# Patient Record
Sex: MALE | Race: BLACK OR AFRICAN AMERICAN | NOT HISPANIC OR LATINO | Employment: FULL TIME | URBAN - METROPOLITAN AREA
[De-identification: names, ages, dates, MRNs, and addresses within clinical notes are randomized per-mention and may not be internally consistent; named-entity substitution may affect disease eponyms.]

---

## 2020-02-03 ENCOUNTER — HOSPITAL ENCOUNTER (EMERGENCY)
Facility: HOSPITAL | Age: 35
Discharge: HOME | End: 2020-02-03
Attending: EMERGENCY MEDICINE

## 2020-02-03 VITALS
TEMPERATURE: 99 F | OXYGEN SATURATION: 98 % | RESPIRATION RATE: 18 BRPM | WEIGHT: 311.63 LBS | HEART RATE: 88 BPM | HEIGHT: 71 IN | SYSTOLIC BLOOD PRESSURE: 143 MMHG | DIASTOLIC BLOOD PRESSURE: 89 MMHG | BODY MASS INDEX: 43.63 KG/M2

## 2020-02-03 DIAGNOSIS — R30.0 DYSURIA: Primary | ICD-10-CM

## 2020-02-03 DIAGNOSIS — J06.9 VIRAL URI WITH COUGH: ICD-10-CM

## 2020-02-03 DIAGNOSIS — R06.02 SHORTNESS OF BREATH: ICD-10-CM

## 2020-02-03 LAB
BILIRUB UR QL STRIP: NEGATIVE
CLARITY UR: CLEAR
COLOR UR: YELLOW
GLUCOSE UR QL STRIP: NEGATIVE
HGB UR QL STRIP: NEGATIVE
KETONES UR QL STRIP: NEGATIVE
LEUKOCYTE ESTERASE UR QL STRIP: NEGATIVE
NITRITE UR QL STRIP: NEGATIVE
PH UR STRIP: 6 [PH] (ref 5–8)
PROT UR QL STRIP: NEGATIVE
SP GR UR STRIP: 1.02 (ref 1–1.03)
URN SPEC COLLECT METH UR: NORMAL
UROBILINOGEN UR STRIP-ACNC: 1 EU/DL

## 2020-02-03 PROCEDURE — 81003 URINALYSIS AUTO W/O SCOPE: CPT

## 2020-02-03 PROCEDURE — 99284 EMERGENCY DEPT VISIT MOD MDM: CPT | Mod: 25

## 2020-02-03 RX ORDER — ALBUTEROL SULFATE 90 UG/1
2 AEROSOL, METERED RESPIRATORY (INHALATION) EVERY 4 HOURS PRN
Qty: 6.7 G | Refills: 0 | Status: SHIPPED | OUTPATIENT
Start: 2020-02-03 | End: 2020-02-10

## 2020-02-03 RX ORDER — BENZONATATE 100 MG/1
100 CAPSULE ORAL 3 TIMES DAILY PRN
Qty: 20 CAPSULE | Refills: 0 | Status: SHIPPED | OUTPATIENT
Start: 2020-02-03 | End: 2020-02-10

## 2020-02-04 NOTE — ED PROVIDER NOTES
Encounter Date: 2/3/2020    SCRIBE #1 NOTE: I, Kvng Peralta, am scribing for, and in the presence of,  Dr. Madrid. I have scribed the entire note.       History     Chief Complaint   Patient presents with    Shortness of Breath     34y M ambulatory to ED with c/o SOB and cough x2 months. also c/o 1 episode of blood in urine earlier today     Tobias Gong is a 34 y.o. male who  has a past medical history of Asthma.    The patient presents to the ED due to concern of hematuria. The patient reports a single episode of hematuria tonight with slight dysuria. He denies any prior similar episodes, but notes an episode of protected intercourse last night. He denies any genital lesions, urethral discharge, history of unprotected intercourse, fever, history of kidney stones, abdominal or any other associated symptoms. He also endorses mild SOB for the past month associated with an intermittently productive cough. He has a history of childhood asthma but is not currently on any medications. He has been taking ibuprofen and Tylenol without improvement.  Patient denies any fever, chest pain, recent illness or known sick contacts.  He denies any other complaints.    The history is provided by the patient.     Review of patient's allergies indicates:  No Known Allergies  Past Medical History:   Diagnosis Date    Asthma      No past surgical history on file.  No family history on file.  Social History     Tobacco Use    Smoking status: Never Smoker   Substance Use Topics    Alcohol use: Yes     Comment: occ    Drug use: No     Review of Systems   Constitutional: Negative for chills and fever.   HENT: Negative for sore throat.    Respiratory: Positive for cough and shortness of breath. Negative for chest tightness.    Cardiovascular: Negative for chest pain, palpitations and leg swelling.   Gastrointestinal: Negative for abdominal pain, constipation, diarrhea, nausea and vomiting.   Genitourinary: Positive for dysuria and  hematuria. Negative for frequency and urgency.   Musculoskeletal: Negative for back pain.   Skin: Negative for rash and wound.   Neurological: Negative for weakness.   Hematological: Does not bruise/bleed easily.   Psychiatric/Behavioral: Negative for agitation, behavioral problems and confusion.       Physical Exam     Initial Vitals [02/03/20 1933]   BP Pulse Resp Temp SpO2   (!) 141/92 87 18 99 °F (37.2 °C) 99 %      MAP       --         Physical Exam    Nursing note and vitals reviewed.  Constitutional: He appears well-developed and well-nourished. He is not diaphoretic. No distress.   Well-appearing, NAD.   HENT:   Head: Normocephalic and atraumatic.   Mouth/Throat: Oropharynx is clear and moist.   Eyes: EOM are normal. Pupils are equal, round, and reactive to light.   Neck: No tracheal deviation present.   Cardiovascular: Normal rate, regular rhythm, normal heart sounds and intact distal pulses.   Pulmonary/Chest: No stridor. No respiratory distress. He has wheezes.   Faint bilateral end-expiratory wheezing   Abdominal: Soft. He exhibits no distension and no mass. There is no tenderness.   Musculoskeletal: Normal range of motion. He exhibits no edema.   Neurological: He is alert and oriented to person, place, and time. No cranial nerve deficit or sensory deficit.   Skin: Skin is warm and dry. Capillary refill takes less than 2 seconds. No rash noted.   Psychiatric: He has a normal mood and affect. His behavior is normal. Thought content normal.         ED Course   Procedures  Labs Reviewed   URINALYSIS, REFLEX TO URINE CULTURE    Narrative:     Preferred Collection Type->Urine, Clean Catch          Imaging Results          X-Ray Chest PA And Lateral (Final result)  Result time 02/03/20 20:15:57    Final result by Artemio Velázquez MD (02/03/20 20:15:57)                 Impression:      No acute intrathoracic process identified.      Electronically signed by: Artemio Velázquez  MD  Date:    02/03/2020  Time:    20:15             Narrative:    EXAMINATION:  XR CHEST PA AND LATERAL    CLINICAL HISTORY:  Shortness of breath    TECHNIQUE:  PA and lateral views of the chest were performed.    COMPARISON:  None    FINDINGS:  Cardiac silhouette is normal in size.  Lungs are symmetrically expanded.  No evidence of focal consolidative process, pneumothorax, or significant effusion.  No acute osseous abnormality identified.                                 Medical Decision Making:   History:   Old Medical Records: I decided to obtain old medical records.  Old Records Summarized: other records.  Initial Assessment:   33 yo M with history of childhood asthma presents to ED mainly concerned about an episode of hematuria that occurred just a few hours prior to arrival.  He also endorses mild SOB and cough that has been persistent over the last few months despite Tylenol/ibuprofen at home.  Vitals reassuring, exam with faint end-expiratory wheezing.  Will obtain CXR, UA, and reassess.  Differential Diagnosis:   Differential Diagnosis includes, but is not limited to:  PE, MI/ACS, pneumothorax, pericardial effusion/tamonade, pneumonia, lung abscess, pericarditis/myocarditis, pleural effusion, lung mass, CHF exacerbation, asthma exacerbation, COPD exacerbation, aspirated/ingested foreign body, airway obstruction, CO poisoning, anemia, metabolic derangement, allergy/atopy, influenza, viral URI, viral syndrome.    Clinical Tests:   Lab Tests: Ordered and Reviewed  Radiological Study: Ordered and Reviewed  ED Management:  CXR clear, no evidence of pneumonia or infiltrate.  UA clean without hematuria or infection.  Vitals stable, no indication for labs or further imaging at this time.  Will treat with albuterol, tessalon for likely viral URI vs mild acute asthma exacerbation.  Recommend close f/u with PCP. Stable for D/C.    On re-evaluation, the patient's status has improved.  After complete ED evaluation,  clinical impression is most consistent with viral URI, asthma exacerbation.  PCP follow-up within 2-3 days was recommended.    After taking into careful account the patient's history, physical exam findings, as well as empirical and objective data obtained throughout ED workup, I feel no emergent medical condition has been identified. No further evaluation or admission was felt to be required, and the patient is stable for discharge from the ED. The patient and any additional family present were updated with test results, overall clinical impression, and recommended further plan of care, including discharge instructions as provided and outpatient follow-up for continued evaluation and management as needed. All questions were answered. The patient expressed understanding and agreed with current plan for discharge and follow-up plan of care. Strict ED return precautions were provided, including return/worsening of current symptoms, new symptoms, or any other concerns.                                   Clinical Impression:       ICD-10-CM ICD-9-CM   1. Dysuria R30.0 788.1   2. Shortness of breath R06.02 786.05   3. Viral URI with cough J06.9 465.9    B97.89      Disposition:   Disposition: Discharged  Condition: Stable        I, Dr. Sampson Madrid, personally performed the services described in this documentation. All medical record entries made by the scribe were at my direction and in my presence.  I have reviewed the chart and agree that the record reflects my personal performance and is accurate and complete.     Sampson Madrid MD.       Sampson Madrid MD  02/03/20 [id number]

## 2020-09-03 NOTE — ED NOTES
Pt presents to the ED with c/o SOB since last month and blood in urine since today. Pt reports SOB is worse on exertion and when trying to sleep. Pt endorses hx of asthma but states he has not had to use his inhaler in years. Pt also reports dysuria but denies flank pain. Denies fever but reports chills.  
Detail Level: Zone
Additional Notes: Patient consent was obtained to proceed with the visit and recommended plan of care after discussion of all risks and benefits, including the risks of COVID-19 exposure.